# Patient Record
Sex: FEMALE | Race: WHITE | NOT HISPANIC OR LATINO | URBAN - METROPOLITAN AREA
[De-identification: names, ages, dates, MRNs, and addresses within clinical notes are randomized per-mention and may not be internally consistent; named-entity substitution may affect disease eponyms.]

---

## 2018-09-05 ENCOUNTER — IMPORTED ENCOUNTER (OUTPATIENT)
Dept: URBAN - METROPOLITAN AREA CLINIC 31 | Facility: CLINIC | Age: 63
End: 2018-09-05

## 2018-09-05 PROBLEM — L71.9: Noted: 2018-09-05

## 2018-09-05 PROBLEM — H10.33: Noted: 2018-09-05

## 2018-09-05 PROCEDURE — 99203 OFFICE O/P NEW LOW 30 MIN: CPT

## 2018-09-05 NOTE — PATIENT DISCUSSION
1.  Non-specific Conjunctivitis OU -- The condition was discussed with patient. DC Gentamicin. Start Polytrim QID and Lotemax gel QID OU. To DC and call if any worsening. 2. Return for an appointment in 5 days for office call. with Dr. Eula Pimentel. 3.  Facial Rosacea - Patient found to have skin changes consistsnt with Rosacea. No sign of ocular complications related to rosacea at this time. Consider evaluation by dermatology.

## 2018-09-05 NOTE — PATIENT DISCUSSION
Facial Rosacea - Patient found to have skin changes consistsnt with Rosacea. No sign of ocular complications related to rosacea at this time. Consider evaluation by dermatology.

## 2018-09-11 ENCOUNTER — IMPORTED ENCOUNTER (OUTPATIENT)
Dept: URBAN - METROPOLITAN AREA CLINIC 31 | Facility: CLINIC | Age: 63
End: 2018-09-11

## 2018-09-11 PROBLEM — H10.33: Noted: 2018-09-11

## 2018-09-11 PROBLEM — H04.123: Noted: 2018-09-11

## 2018-09-11 PROCEDURE — 99213 OFFICE O/P EST LOW 20 MIN: CPT

## 2018-09-11 NOTE — PATIENT DISCUSSION
1.  Non-specific Conjunctivitis OU -- The condition has improved significantly. Continue present gtt for 3 more days the DC Polytrim and taper Lotemax gel to 1 gt HS OU X 1W then DC. 2.  Dry Eyes OU:  Start artificials tears. Encouraged regular use. 3.  Return for an appointment in 6 months for comprehensive exam. with Dr. Raúl Forte.

## 2019-11-04 NOTE — PROCEDURE NOTE: SURGICAL
MR #: [id number]<ZS />  <br />  PREOPERATIVE DIAGNOSIS: Cataract, right eye.<br />  <br />  POSTOPERATIVE DIAGNOSIS: Same.<br />  <br />  OPERATIVE PROCEDURE: Phacoemulsification with +19.0 diopter Winston &amp; Winston AAB00, PC-IOL, right eye.<br />  <br />  PROCEDURE:&nbsp; Following sedation, Flora Win CRNA, administered topical anesthesia in the form of lidocaine 2% gel as per the anesthetic record. <br />  <br />  Prior to commencing surgery patient identification, surgical procedure, site, and side were confirmed by Dr. Bret Uribe. &nbsp; The patient was then prepped with Betadine and draped with sterile drapes. A lid speculum was placed and the side port incision prepared. &nbsp; 0.5 cc of Epi-Shugarcaine was instilled and the anterior chamber entered at the temporal 180° limbus in a single plane fashion employing a 2.7 mm trapezoid homero and ring fixation. Viscoelastic was placed, a circular capsulorhexis performed, hydrodissection accomplished and the nucleus emulsified within the posterior chamber. Cortex was aspirated with the I/A handpiece, the posterior capsule polished and viscoelastic instilled to distend the capsular sac. A +19.0 diopter Winston &amp; Grecia Ormond was placed into the bag under direct visualization without difficulty employing the microinjector. Viscoelastic was aspirated with the I/A handpiece and the anterior chamber pressurized with BSS. The incision was tested for leaks and none were found. A single injection of 0.2 cc of Moxifloxacin was placed in the anterior chamber. The patient returned to the holding area having tolerated the procedure extremely well and without complication. <br />  <br />  Epi-shugarcaine consists of a mixture of 1cc epinephrine MPF 1:1000, 0.75 cc 4% lidocaine MPF and 2.25 cc BSS. Moxifloxacin consists of a mixture of Vigamox and BSS 1 mg/1 ml solution. <br />  <br />  <br />

## 2019-11-04 NOTE — PATIENT DISCUSSION
Patient advised of the right to post-operative care by the surgeon. Patient is fully informed of, and agreed to, co-management with their primary optometric physician. Post-operative care by the surgeon is not medically necessary and co-management is clinically appropriate. Patient has received itemization of fees related to cataract surgery. Transfer of care letter completed for the patient. Transfer care of right eye to Dr. Tiesha Blankenship on 11/4/19. Patient instructed to call immediately if any new distortion, blurring, decreased vision or eye pain.

## 2019-11-11 NOTE — PROCEDURE NOTE: SURGICAL
MR #: [id number]<CN />  <br />  PREOPERATIVE DIAGNOSIS: Cataract, left eye.<br />  <br />  POSTOPERATIVE DIAGNOSIS: Same.<br />  <br />  OPERATIVE PROCEDURE: Phacoemulsification with +20.0 diopter Winston &amp; Winston AAB00, PC-IOL, left eye.<br />  <br />  PROCEDURE:&nbsp; Following sedation, Katarzyna Tesfaye CRNA administered topical anesthesia in the form of lidocaine 2% gel as per the anesthetic record. <br />  <br />  Prior to commencing surgery patient identification, surgical procedure, site, and side were confirmed by Dr. Jairon Johnson. &nbsp; The patient was then prepped with Betadine and draped with sterile drapes. A lid speculum was placed and the side port incision prepared. &nbsp; 0.5 cc of Epi-Shugarcaine was instilled and the anterior chamber entered at the temporal 180° limbus in a single plane fashion employing a 2.7 mm trapezoid homero and ring fixation. Viscoelastic was placed, a circular capsulorhexis performed, hydrodissection accomplished and the nucleus emulsified within the posterior chamber. Cortex was aspirated with the I/A handpiece, the posterior capsule polished and viscoelastic instilled to distend the capsular sac. A +20.0 diopter Winston &amp; Damion Memory was placed into the bag under direct visualization without difficulty employing the microinjector. Viscoelastic was aspirated with the I/A handpiece and the anterior chamber pressurized with BSS. The incision was tested for leaks and none were found. A single injection of 0.2 cc of Moxifloxacin was placed in the anterior chamber. The patient returned to the holding area having tolerated the procedure extremely well and without complication. <br />  <br />  Epi-shugarcaine consists of a mixture of 1cc epinephrine MPF 1:1000, 0.75 cc 4% lidocaine MPF and 2.25 cc BSS. Moxifloxacin consists of a mixture of Vigamox and BSS 1 mg/1 ml solution. <br />  <br />  <br />

## 2019-11-11 NOTE — PATIENT DISCUSSION
Cataract surgery has been performed in the first eye and activities of daily living are still impaired. The patient would like to proceed with cataract surgery in the second eye as scheduled. The patient elects Basic OS, goal of -2.00.

## 2022-04-01 ASSESSMENT — VISUAL ACUITY
OS_SC: 20/200
OS_CC: 20/200
OD_SC: 20/20
OD_CC: 20/200
OD_SC: 20/200
OS_SC: 20/25
OU_SC: 20/200

## 2024-08-05 NOTE — PATIENT DISCUSSION
Discussed increased risk of floaters, myopic degeneration and retinal detachment associated with high myopia. mouth 3 times daily for 360 days. Intended supply: 90 days 270 capsule 3    oxyCODONE (OXYCONTIN) 15 MG T12A extended release tablet Take 1 tablet by mouth in the morning and 1 tablet in the evening. Do all this for 30 days. Max Daily Amount: 30 mg. 60 tablet 0    oxyCODONE HCl (OXY-IR) 10 MG immediate release tablet Take 1 tablet by mouth every 4 hours as needed for Pain for up to 30 days. Intended supply: 30 days Max Daily Amount: 60 mg 180 tablet 0    droNABinol (MARINOL) 5 MG capsule Take 1 capsule by mouth every 6-8 hours as needed (nausea) for up to 90 days. Max Daily Amount: 20 mg 90 capsule 0    clonazePAM (KLONOPIN) 1 MG tablet Take 1 tablet by mouth 2 times daily as needed for Anxiety for up to 90 days. Max Daily Amount: 2 mg 60 tablet 2    cabozantinib s-malate (CABOMETYX) 40 MG TABS chemo tablet Take 1 tablet by mouth daily 30 tablet 3    Magic Mouthwash (MIRACLE MOUTHWASH) Swish and spit 5 mLs 4 times daily as needed for Irritation 480 mL 1    ondansetron (ZOFRAN) 4 MG tablet Take 1 tablet by mouth 3 times daily as needed for Nausea or Vomiting 15 tablet 2    Urea (CARMOL) 40 % cream Apply to hands and feet as needed. 120 g 0    sertraline (ZOLOFT) 100 MG tablet Take 1.5 tablets by mouth at bedtime At nights 45 tablet 11    hyoscyamine (LEVSIN/SL) 125 MCG sublingual tablet DISSOLVE 1 TABLET ON TONGUE EVERY 6 HOURS AS NEEDED FOR STOMACH PAIN      pantoprazole (PROTONIX) 40 MG tablet Take 1 tablet by mouth in the morning and at bedtime      promethazine (PHENERGAN) 25 MG tablet Take 1 tablet by mouth every 6 hours as needed for Nausea 60 tablet 1    Continuous Blood Gluc Sensor (DEXCOM G7 SENSOR) MISC Use daily. Change sensor every 10 days (Patient not taking: Reported on 7/26/2024) 3 each 11    blood glucose test strips (FREESTYLE TEST STRIPS) strip 1 each by In Vitro route daily As needed. 100 each 3    cetirizine (ZYRTEC) 10 MG tablet Take 1 tablet by mouth at bedtime      albuterol sulfate HFA (PROAIR

## 2025-07-14 NOTE — PATIENT DISCUSSION
Patient understands there is an increased risk of corneal edema after cataract surgery. [phone]